# Patient Record
Sex: MALE | NOT HISPANIC OR LATINO | URBAN - NONMETROPOLITAN AREA
[De-identification: names, ages, dates, MRNs, and addresses within clinical notes are randomized per-mention and may not be internally consistent; named-entity substitution may affect disease eponyms.]

---

## 2020-07-24 ENCOUNTER — OFFICE VISIT CONVERTED (OUTPATIENT)
Dept: FAMILY MEDICINE CLINIC | Age: 35
End: 2020-07-24
Attending: NURSE PRACTITIONER

## 2021-05-18 NOTE — PROGRESS NOTES
Darrell Encarnacion  1985     Office/Outpatient Visit    Visit Date: Fri, Jul 24, 2020 09:45 am    Provider: Kimber Xavier N.P. (Assistant: Reta Interiano LPN)    Location: [facility]        Electronically signed by Kimber Xavier N.P. on  07/24/2020 10:28:07 AM                             Subjective:        CC: Jean Pierre is a 34 year old White male.  New pt, establish care/Poss pink eye in right eye Doxemity [phone]        HPI:       Tele chase video visit Right eye infection x 3-4 days , no fever, no cough, no exposure , working from Home for [employer], no one else with eye infection    ROS:     CONSTITUTIONAL:  Negative for chills, fatigue, fever, and weight change.      EYES:  Positive for eye drainage ( purulent right eye drainage ).      CARDIOVASCULAR:  Negative for chest pain, palpitations, tachycardia, orthopnea, and edema.      RESPIRATORY:  Negative for recent cough, dyspnea and frequent wheezing.      GASTROINTESTINAL:  Negative for abdominal pain, constipation, diarrhea, nausea and vomiting.      GENITOURINARY:  Negative for dysuria and hematuria.      PSYCHIATRIC:  Negative for anxiety, depression, and sleep disturbances.          Past Medical History / Family History / Social History:         Last Reviewed on 7/24/2020 10:19 AM by Kimber Xavier    Past Medical History:     UNREMARKABLE         Surgical History:     NONE         Family History:     Father: Healthy     Mother: Healthy         Social History:     Occupation: [employer]     Marital Status:      Children: 4 children         Tobacco/Alcohol/Supplements:     Last Reviewed on 7/24/2020 10:19 AM by Kimber Xavier    Tobacco: He has never smoked.          Substance Abuse History:     Last Reviewed on 7/24/2020 10:19 AM by Kimber Xavier        Mental Health History:     Last Reviewed on 7/24/2020 10:19 AM by Kimber Xavier        Communicable Diseases (eg STDs):     Last Reviewed on 7/24/2020  10:19 AM by Kimber Xavier        Current Problems:     Last Reviewed on 7/24/2020 10:19 AM by Kimber Xavier    None Recorded        Immunizations:     None        Allergies:     Last Reviewed on 7/24/2020 10:19 AM by Kimber Xavier    No Known Allergies.        Current Medications:     Last Reviewed on 7/24/2020 10:19 AM by Kimber Xavier    None        Objective:        Exams:     PHYSICAL EXAM:     GENERAL: well developed, well nourished;  well groomed;  no apparent distress;     EYES: extraocular movements intact; purulent right eye drainage;  PERRLA;     RESPIRATORY: normal respiratory rate and pattern with no distress;     NEUROLOGIC: GROSSLY INTACT     PSYCHIATRIC:  appropriate affect and demeanor; normal speech pattern; grossly normal memory;         Assessment:         H10.021   Other mucopurulent conjunctivitis, right eye           ORDERS:         Meds Prescribed:       [Refilled] tobramycin 0.3 % ophthalmic (eye) Drops [1 drop tid  x 5 days], #5 (five) milliliters, Refills: 0 (zero)                 Plan:         Other mucopurulent conjunctivitis, right eye    Telehealth: Verbal consent obtained for visit to occur via televideo conferencing; Staff, other than provider, present during telephone visit include Sergio Mays pt Kimber Xavier APRN     FOLLOW-UP: Schedule follow-up appointments on a p.r.n. basis.:.            Prescriptions:       [Refilled] tobramycin 0.3 % ophthalmic (eye) Drops [1 drop tid  x 5 days], #5 (five) milliliters, Refills: 0 (zero)             Patient Recommendations:        For  Other mucopurulent conjunctivitis, right eye:    Schedule follow-up appointments as needed.                APPOINTMENT INFORMATION:        Monday Tuesday Wednesday Thursday Friday Saturday Sunday            Time:___________________AM  PM   Date:_____________________             Charge Capture:         Primary Diagnosis:     H10.021  Other mucopurulent conjunctivitis, right eye            Orders:      [id number]  Office visit - new pt, level 2  (In-House)